# Patient Record
Sex: FEMALE | Race: WHITE | NOT HISPANIC OR LATINO
[De-identification: names, ages, dates, MRNs, and addresses within clinical notes are randomized per-mention and may not be internally consistent; named-entity substitution may affect disease eponyms.]

---

## 2017-12-22 PROBLEM — Z00.00 ENCOUNTER FOR PREVENTIVE HEALTH EXAMINATION: Status: ACTIVE | Noted: 2017-12-22

## 2017-12-27 ENCOUNTER — APPOINTMENT (OUTPATIENT)
Dept: BREAST CENTER | Facility: CLINIC | Age: 42
End: 2017-12-27
Payer: MEDICAID

## 2017-12-27 VITALS
HEART RATE: 85 BPM | WEIGHT: 118 LBS | OXYGEN SATURATION: 98 % | SYSTOLIC BLOOD PRESSURE: 160 MMHG | HEIGHT: 63 IN | DIASTOLIC BLOOD PRESSURE: 92 MMHG | BODY MASS INDEX: 20.91 KG/M2

## 2017-12-27 DIAGNOSIS — Z80.0 FAMILY HISTORY OF MALIGNANT NEOPLASM OF DIGESTIVE ORGANS: ICD-10-CM

## 2017-12-27 DIAGNOSIS — Z98.891 HISTORY OF UTERINE SCAR FROM PREVIOUS SURGERY: ICD-10-CM

## 2017-12-27 DIAGNOSIS — Z98.890 OTHER SPECIFIED POSTPROCEDURAL STATES: ICD-10-CM

## 2017-12-27 DIAGNOSIS — Z80.3 FAMILY HISTORY OF MALIGNANT NEOPLASM OF BREAST: ICD-10-CM

## 2017-12-27 DIAGNOSIS — Z80.52 FAMILY HISTORY OF MALIGNANT NEOPLASM OF BLADDER: ICD-10-CM

## 2017-12-27 DIAGNOSIS — Z80.1 FAMILY HISTORY OF MALIGNANT NEOPLASM OF TRACHEA, BRONCHUS AND LUNG: ICD-10-CM

## 2017-12-27 PROCEDURE — 99203 OFFICE O/P NEW LOW 30 MIN: CPT

## 2018-01-03 ENCOUNTER — OUTPATIENT (OUTPATIENT)
Dept: OUTPATIENT SERVICES | Facility: HOSPITAL | Age: 43
LOS: 1 days | Discharge: HOME | End: 2018-01-03

## 2018-01-03 DIAGNOSIS — Z02.9 ENCOUNTER FOR ADMINISTRATIVE EXAMINATIONS, UNSPECIFIED: ICD-10-CM

## 2018-01-12 ENCOUNTER — OUTPATIENT (OUTPATIENT)
Dept: OUTPATIENT SERVICES | Facility: HOSPITAL | Age: 43
LOS: 1 days | Discharge: HOME | End: 2018-01-12

## 2018-01-12 DIAGNOSIS — N60.22 FIBROADENOSIS OF LEFT BREAST: ICD-10-CM

## 2018-01-12 DIAGNOSIS — R92.0 MAMMOGRAPHIC MICROCALCIFICATION FOUND ON DIAGNOSTIC IMAGING OF BREAST: ICD-10-CM

## 2018-01-12 DIAGNOSIS — N60.32 FIBROSCLEROSIS OF LEFT BREAST: ICD-10-CM

## 2018-01-12 DIAGNOSIS — R92.1 MAMMOGRAPHIC CALCIFICATION FOUND ON DIAGNOSTIC IMAGING OF BREAST: ICD-10-CM

## 2018-01-12 DIAGNOSIS — N62 HYPERTROPHY OF BREAST: ICD-10-CM

## 2018-01-16 ENCOUNTER — CHART COPY (OUTPATIENT)
Age: 43
End: 2018-01-16

## 2018-01-16 DIAGNOSIS — R92.8 OTHER ABNORMAL AND INCONCLUSIVE FINDINGS ON DIAGNOSTIC IMAGING OF BREAST: ICD-10-CM

## 2018-01-19 ENCOUNTER — APPOINTMENT (OUTPATIENT)
Dept: BREAST CENTER | Facility: CLINIC | Age: 43
End: 2018-01-19

## 2018-01-26 ENCOUNTER — OUTPATIENT (OUTPATIENT)
Dept: OUTPATIENT SERVICES | Facility: HOSPITAL | Age: 43
LOS: 1 days | Discharge: HOME | End: 2018-01-26

## 2018-01-26 DIAGNOSIS — R10.10 UPPER ABDOMINAL PAIN, UNSPECIFIED: ICD-10-CM

## 2018-07-20 ENCOUNTER — APPOINTMENT (OUTPATIENT)
Dept: BREAST CENTER | Facility: CLINIC | Age: 43
End: 2018-07-20

## 2018-07-26 PROBLEM — Z80.0 FAMILY HISTORY OF COLON CANCER: Status: ACTIVE | Noted: 2017-12-27

## 2018-08-23 ENCOUNTER — FORM ENCOUNTER (OUTPATIENT)
Age: 43
End: 2018-08-23

## 2018-08-24 ENCOUNTER — OUTPATIENT (OUTPATIENT)
Dept: OUTPATIENT SERVICES | Facility: HOSPITAL | Age: 43
LOS: 1 days | Discharge: HOME | End: 2018-08-24

## 2018-08-24 DIAGNOSIS — R92.8 OTHER ABNORMAL AND INCONCLUSIVE FINDINGS ON DIAGNOSTIC IMAGING OF BREAST: ICD-10-CM

## 2018-09-12 ENCOUNTER — APPOINTMENT (OUTPATIENT)
Dept: BREAST CENTER | Facility: CLINIC | Age: 43
End: 2018-09-12

## 2019-08-21 ENCOUNTER — TRANSCRIPTION ENCOUNTER (OUTPATIENT)
Age: 44
End: 2019-08-21

## 2021-03-17 ENCOUNTER — OUTPATIENT (OUTPATIENT)
Dept: OUTPATIENT SERVICES | Facility: HOSPITAL | Age: 46
LOS: 1 days | Discharge: HOME | End: 2021-03-17
Payer: MEDICAID

## 2021-03-17 DIAGNOSIS — Z12.31 ENCOUNTER FOR SCREENING MAMMOGRAM FOR MALIGNANT NEOPLASM OF BREAST: ICD-10-CM

## 2021-03-17 DIAGNOSIS — R92.2 INCONCLUSIVE MAMMOGRAM: ICD-10-CM

## 2021-03-17 PROCEDURE — 77067 SCR MAMMO BI INCL CAD: CPT | Mod: 26

## 2021-03-17 PROCEDURE — 77063 BREAST TOMOSYNTHESIS BI: CPT | Mod: 26

## 2021-03-17 PROCEDURE — 76641 ULTRASOUND BREAST COMPLETE: CPT | Mod: 26,50

## 2021-05-11 ENCOUNTER — TRANSCRIPTION ENCOUNTER (OUTPATIENT)
Age: 46
End: 2021-05-11

## 2021-05-11 ENCOUNTER — NON-APPOINTMENT (OUTPATIENT)
Age: 46
End: 2021-05-11

## 2021-05-12 ENCOUNTER — NON-APPOINTMENT (OUTPATIENT)
Age: 46
End: 2021-05-12

## 2021-05-17 ENCOUNTER — NON-APPOINTMENT (OUTPATIENT)
Age: 46
End: 2021-05-17

## 2021-05-18 ENCOUNTER — NON-APPOINTMENT (OUTPATIENT)
Age: 46
End: 2021-05-18

## 2021-06-10 ENCOUNTER — TRANSCRIPTION ENCOUNTER (OUTPATIENT)
Age: 46
End: 2021-06-10

## 2021-07-06 ENCOUNTER — LABORATORY RESULT (OUTPATIENT)
Age: 46
End: 2021-07-06

## 2021-07-06 ENCOUNTER — APPOINTMENT (OUTPATIENT)
Dept: UROLOGY | Facility: CLINIC | Age: 46
End: 2021-07-06
Payer: COMMERCIAL

## 2021-07-06 ENCOUNTER — RESULT REVIEW (OUTPATIENT)
Age: 46
End: 2021-07-06

## 2021-07-06 VITALS — BODY MASS INDEX: 22.15 KG/M2 | WEIGHT: 125 LBS | HEIGHT: 63 IN

## 2021-07-06 PROCEDURE — 99204 OFFICE O/P NEW MOD 45 MIN: CPT

## 2021-07-06 NOTE — HISTORY OF PRESENT ILLNESS
[FreeTextEntry1] : OLIVIA GARICA is a 46 year old female who presents for consultation for recurrent UTIs for almost 2 decades. \par \par Pt reports she is experiencing dysuria, increased frequency, urgency. Denies gross hematuria. \par Pt reports the Sx have been persistent for 20 years since she had her first born. Pt reports she has been getting UTIs since her first pregnancy. \par Pt reports the UTIs would result as a result of post coital. Pt was taking post coital prophylaxis.\par UTI Sx resolution with Cipro but not with Macrobid. \par \par pt reports she saw her OBGYN in  and repots the pelvic exam was normal (as per pt). \par Pt reports she is taking 500 mg vitamin C per day. \par \par  PMH: recurrent post coital UTIs since the pt's first pregnancy. Pt reports 5-6  Symptomatic recurrent UTIs per year. \par Family History: No  malignancies\par Social History: Pt delivered with . [employer].\par \par UCx from 2021: Minor Marcescens >100,000 resist to bactrim\par \par

## 2021-07-06 NOTE — ASSESSMENT
[FreeTextEntry1] : OLIVIA GARCIA is a 46 year old female who presents for consultation for recurrent UTIs for 20 years, failed post coital ppx w macrobid.\par \par Plan: \par Start Methenamine with vitamin C. discussed side effects\par Increase fluid intake\par UA, UCx \par RBUS \par f/u in 2 months with the results. \par In the future we will consider daily low-dose antibiotic prophylaxis however at this time prefer to defer this option as patient is already forming resistant organism.  We can straight cath prior to necessary

## 2021-07-06 NOTE — ADDENDUM
[FreeTextEntry1] : Patient's note was transcribed with the assistance of a medical scribe under the supervision of Dr. Hess.\par I, Dr. Hess, have reviewed the patient's chart and agree that it aligns with my medical decisions.\par Hola Boothe, our scribe, also served as a chaperone for physical examination purposes.\par \par \par

## 2021-07-08 ENCOUNTER — NON-APPOINTMENT (OUTPATIENT)
Age: 46
End: 2021-07-08

## 2021-07-12 ENCOUNTER — NON-APPOINTMENT (OUTPATIENT)
Age: 46
End: 2021-07-12

## 2021-07-12 ENCOUNTER — OUTPATIENT (OUTPATIENT)
Dept: OUTPATIENT SERVICES | Facility: HOSPITAL | Age: 46
LOS: 1 days | Discharge: HOME | End: 2021-07-12
Payer: COMMERCIAL

## 2021-07-12 DIAGNOSIS — N39.0 URINARY TRACT INFECTION, SITE NOT SPECIFIED: ICD-10-CM

## 2021-07-12 LAB — BACTERIA UR CULT: ABNORMAL

## 2021-07-12 PROCEDURE — 76770 US EXAM ABDO BACK WALL COMP: CPT | Mod: 26

## 2021-07-15 ENCOUNTER — NON-APPOINTMENT (OUTPATIENT)
Age: 46
End: 2021-07-15

## 2021-09-13 ENCOUNTER — NON-APPOINTMENT (OUTPATIENT)
Age: 46
End: 2021-09-13

## 2021-09-13 LAB
APPEARANCE: CLEAR
BACTERIA UR CULT: NORMAL
BILIRUBIN URINE: NEGATIVE
BLOOD URINE: NEGATIVE
COLOR: NORMAL
GLUCOSE QUALITATIVE U: NEGATIVE
KETONES URINE: NEGATIVE
LEUKOCYTE ESTERASE URINE: NEGATIVE
NITRITE URINE: NEGATIVE
PH URINE: 6
PROTEIN URINE: NEGATIVE
SPECIFIC GRAVITY URINE: 1.01
UROBILINOGEN URINE: NORMAL

## 2021-09-14 ENCOUNTER — APPOINTMENT (OUTPATIENT)
Dept: UROLOGY | Facility: CLINIC | Age: 46
End: 2021-09-14
Payer: COMMERCIAL

## 2021-09-14 DIAGNOSIS — N39.0 URINARY TRACT INFECTION, SITE NOT SPECIFIED: ICD-10-CM

## 2021-09-14 DIAGNOSIS — R33.9 RETENTION OF URINE, UNSPECIFIED: ICD-10-CM

## 2021-09-14 DIAGNOSIS — M62.89 OTHER SPECIFIED DISORDERS OF MUSCLE: ICD-10-CM

## 2021-09-14 PROCEDURE — 99214 OFFICE O/P EST MOD 30 MIN: CPT | Mod: 95

## 2021-09-14 NOTE — ASSESSMENT
[FreeTextEntry1] : OLIVIA GARCIA is a 46 year old female who presents for consultation for recurrent UTIs for 20 years, failed post coital ppx with Macrobid. Pt is currently being treated with methenamine with satisfactory results and negative UCx.  \par \par Given incomplete bladder emptying with obstructive voiding symptoms, suspect pelvic floor dysfunction.  Recommend follow-up with urogynecology and examination. discussed  options such as meds, pelvic floor PT/biofeedback\par \par Plan: \par Continue Methenamine with vitamin C. \par Increase fluid intake\par Sitz bath\par timed voiding every 4 hours \par

## 2021-09-14 NOTE — HISTORY OF PRESENT ILLNESS
[Home] : at home, [unfilled] , at the time of the visit. [Medical Office: ([facility])___] : at the medical office located in  [FreeTextEntry1] : OLIVIA GARCIA is a 46 year old female who presents for consultation for recurrent UTIs for 20 years, failed post coital ppx w macrobid.\par \par Pt reports the methenamine treatment has improved her symptoms. Pt reports she has been feeling well with the treatment and is not experiencing UTI symptoms anymore. \par Pt denies dysuria, gross hematuria. Pt reports she is experiencing occasional hesitancy, intermittency and straining. Pt denies dyspareunia at this time. \par \par 2021: \par RBUS images visualized show:  Normal renal ultrasound. no stones or hydro\par Urinary bladder post void residual volume of 204 mL.\par \par 2021:\par UCx=ngtd \par UA:negative \par \par 2021:\par UCx= >100,000 CFU/ml Klebsiella variicola\par UA: negative // RBC=2 // WBC=2 // Bacteria=moderate \par \par Previously: \par Sx present >20 years\par pt reports she saw her OBGYN in  and repots the pelvic exam was normal (as per pt). \par Pt reports she is taking 500 mg vitamin C per day. \par \par  PMH: recurrent post coital UTIs since the pt's first pregnancy. Pt reports 5-6  Symptomatic recurrent UTIs per year. \par Family History: No  malignancies\par Social History: Pt delivered with . Real Estate.\par \par UCx from 2021: Minor Marcescens >100,000 resist to bactrim\par \par

## 2022-10-14 ENCOUNTER — RX RENEWAL (OUTPATIENT)
Age: 47
End: 2022-10-14

## 2022-12-30 DIAGNOSIS — Z12.39 ENCOUNTER FOR OTHER SCREENING FOR MALIGNANT NEOPLASM OF BREAST: ICD-10-CM

## 2023-01-05 ENCOUNTER — NON-APPOINTMENT (OUTPATIENT)
Age: 48
End: 2023-01-05

## 2023-01-05 ENCOUNTER — APPOINTMENT (OUTPATIENT)
Dept: OBGYN | Facility: CLINIC | Age: 48
End: 2023-01-05
Payer: COMMERCIAL

## 2023-01-05 VITALS
HEART RATE: 96 BPM | TEMPERATURE: 98 F | BODY MASS INDEX: 22.15 KG/M2 | WEIGHT: 125 LBS | SYSTOLIC BLOOD PRESSURE: 129 MMHG | HEIGHT: 63 IN | DIASTOLIC BLOOD PRESSURE: 80 MMHG

## 2023-01-05 DIAGNOSIS — N64.4 MASTODYNIA: ICD-10-CM

## 2023-01-05 LAB
BILIRUB UR QL STRIP: NEGATIVE
CLARITY UR: CLEAR
COLLECTION METHOD: NORMAL
GLUCOSE UR-MCNC: NEGATIVE
HCG UR QL: 0.2 EU/DL
HGB UR QL STRIP.AUTO: NEGATIVE
KETONES UR-MCNC: NEGATIVE
LEUKOCYTE ESTERASE UR QL STRIP: NEGATIVE
NITRITE UR QL STRIP: NEGATIVE
PH UR STRIP: 6.5
PROT UR STRIP-MCNC: NEGATIVE
SP GR UR STRIP: 1.01

## 2023-01-05 PROCEDURE — 99396 PREV VISIT EST AGE 40-64: CPT

## 2023-01-05 PROCEDURE — 81003 URINALYSIS AUTO W/O SCOPE: CPT | Mod: QW

## 2023-01-05 NOTE — REVIEW OF SYSTEMS
[Breast Pain] : breast pain [Negative] : Heme/Lymph [FreeTextEntry8] : followed by uro for incomplete emptying of bladder [de-identified] : bilateral breast pain, thinks it from the implants, wants removal

## 2023-01-05 NOTE — HISTORY OF PRESENT ILLNESS
[Patient reported PAP Smear was normal] : Patient reported PAP Smear was normal [N] : Patient does not use contraception [Y] : Positive pregnancy history [Diffused Pain] : diffused pain [Moderate] : moderate [Regular Cycle Intervals] : periods have been regular [Currently Active] : currently active [Men] : men [No] : No [FreeTextEntry1] : annual visit, no gyn complaints\par regular menses\par pt wants to remove her implants, has breast pain, worse prior to menses\par pt followed by uro for incomplete emptying of bladder [Mammogramdate] : 3/2021 [TextBox_19] : Breast Imaging [PapSmeardate] : 3/2021 [LMPDate] : 12/9/22 [MensesFreq] : 28 [MensesLength] : 4 [MensesAmount] : heavy day 1  only [PGHxTotal] : 2 [[facility] [TextBox_17] : bilateral

## 2023-01-05 NOTE — PHYSICAL EXAM
[Chaperone Present] : A chaperone was present in the examining room during all aspects of the physical examination [Appropriately responsive] : appropriately responsive [Alert] : alert [No Acute Distress] : no acute distress [Soft] : soft [Non-tender] : non-tender [Non-distended] : non-distended [Oriented x3] : oriented x3 [Examination Of The Breasts] : a normal appearance [] : implants [No Masses] : no breast masses were palpable [Labia Majora] : normal [Labia Minora] : normal [Normal] : normal [Uterine Adnexae] : normal

## 2023-01-08 LAB
C TRACH RRNA SPEC QL NAA+PROBE: NOT DETECTED
HPV HIGH+LOW RISK DNA PNL CVX: NOT DETECTED
N GONORRHOEA RRNA SPEC QL NAA+PROBE: NOT DETECTED
SOURCE AMPLIFICATION: NORMAL

## 2023-01-09 LAB — CYTOLOGY CVX/VAG DOC THIN PREP: NORMAL

## 2023-03-23 DIAGNOSIS — Z98.82 BREAST IMPLANT STATUS: ICD-10-CM

## 2023-04-05 ENCOUNTER — NON-APPOINTMENT (OUTPATIENT)
Age: 48
End: 2023-04-05

## 2023-04-12 ENCOUNTER — APPOINTMENT (OUTPATIENT)
Dept: PLASTIC SURGERY | Facility: CLINIC | Age: 48
End: 2023-04-12
Payer: COMMERCIAL

## 2023-04-12 VITALS — BODY MASS INDEX: 22.15 KG/M2 | WEIGHT: 125 LBS | HEIGHT: 63 IN

## 2023-04-12 PROCEDURE — 99203 OFFICE O/P NEW LOW 30 MIN: CPT

## 2023-04-12 NOTE — PHYSICAL EXAM
[NI] : Normal [de-identified] : well-appearing NAD [de-identified] : EOMI [de-identified] : no truncal asymmetry [de-identified] : Bilateral breasts symmetrical and Grade 1 ptosis.  No capsular contracture. Well-healed IMF scars.  BL breast implant animation with PMM contraction.\par Left breast: no palpable masses, nipple retraction or discharge.\par Right breast: no palpable masses, nipple retraction or discharge.\par Axilla: no clinically palpable lymph adenopathy bilateral\par Palpable LD muscle with moderate minimal soft tissue bulk, bilateral\par \par Breast measurements (standing; (cm)):\par Regnault ptosis grade: 1\par R SN-Nipple: 27\par R Nipple-IMF: 7\par R Base width: 14\par R Nipple - midsternum: 8.5\par R NAC diameter: 4\par \par IMF position asymmetrical, left 0.5 cm lower than right breast\par \par L SN-Nipple: 26\par L Nipple-IMF: 8\par L Base width: 14\par L Nipple - midsternum: 9\par L NAC diameter: 3.5\par

## 2023-04-12 NOTE — HISTORY OF PRESENT ILLNESS
[FreeTextEntry1] : 47 yo F with  with PMHx of chronic UTI and s/p submuscular saline breast augmentation ([id number] Dr. Cabral.  Pt would like to establish care with new Plastic surgeon. Pt thinks 320 ml fill BL.  She is here today with c/o Bl breast pain for several months occuring 1 week before menstruation and breast heaviness.  This has prompted her to consider implant removal.  No history of fatigue or joint pain, no breast masses or nipple discharge.  She had breast bx left breast bx - benign (2018)\par \par Has mammogram schedule for 23.\par Breast -full  A cup\par Current bra size 34 C, now DD\par No breast feeding history

## 2023-04-12 NOTE — ASSESSMENT
[FreeTextEntry1] : 49 yo F with  with PMHx of chronic UTI and s/p submuscular saline breast augmentation () with breast pain.  Inquiring about breast implant removal\par \par No evidence of implant capsular contracture or periprosthetic fluid or constitutional symptoms\par Excellent breast augmentation shape and symmetry\par \par No indication for implant removal\par \par -Reviewed options and outcomes should patient elective breast implant removal:\par 1. Breast saline implant rupture (purposeful needle aspiration), observation for post-rupture breast volume before proceeding removal with or without mastopexy (breast lift)\par 2. Bilateral breast implant removal and concurrent mastopexy (smaller final breast volume, possible contour irregularities)\par \par -recommend evaluation for Breast Surgery after annual mammogram for breast pain before considering implant removal.\par -all questions were answered\par -follow up after Breast surgery evaluation.

## 2023-05-10 ENCOUNTER — RESULT REVIEW (OUTPATIENT)
Age: 48
End: 2023-05-10

## 2023-05-10 ENCOUNTER — OUTPATIENT (OUTPATIENT)
Dept: OUTPATIENT SERVICES | Facility: HOSPITAL | Age: 48
LOS: 1 days | End: 2023-05-10
Payer: COMMERCIAL

## 2023-05-10 DIAGNOSIS — R92.2 INCONCLUSIVE MAMMOGRAM: ICD-10-CM

## 2023-05-10 DIAGNOSIS — Z12.31 ENCOUNTER FOR SCREENING MAMMOGRAM FOR MALIGNANT NEOPLASM OF BREAST: ICD-10-CM

## 2023-05-10 PROCEDURE — 77063 BREAST TOMOSYNTHESIS BI: CPT | Mod: 26

## 2023-05-10 PROCEDURE — 77067 SCR MAMMO BI INCL CAD: CPT

## 2023-05-10 PROCEDURE — 77067 SCR MAMMO BI INCL CAD: CPT | Mod: 26

## 2023-05-10 PROCEDURE — 76641 ULTRASOUND BREAST COMPLETE: CPT | Mod: 26,50

## 2023-05-10 PROCEDURE — 77063 BREAST TOMOSYNTHESIS BI: CPT

## 2023-05-10 PROCEDURE — 76641 ULTRASOUND BREAST COMPLETE: CPT | Mod: 50

## 2023-05-11 DIAGNOSIS — Z12.31 ENCOUNTER FOR SCREENING MAMMOGRAM FOR MALIGNANT NEOPLASM OF BREAST: ICD-10-CM

## 2023-05-11 DIAGNOSIS — R92.2 INCONCLUSIVE MAMMOGRAM: ICD-10-CM

## 2023-05-22 ENCOUNTER — APPOINTMENT (OUTPATIENT)
Dept: PLASTIC SURGERY | Facility: CLINIC | Age: 48
End: 2023-05-22

## 2023-07-20 ENCOUNTER — LABORATORY RESULT (OUTPATIENT)
Age: 48
End: 2023-07-20

## 2023-07-20 ENCOUNTER — APPOINTMENT (OUTPATIENT)
Dept: OBGYN | Facility: CLINIC | Age: 48
End: 2023-07-20
Payer: COMMERCIAL

## 2023-07-20 ENCOUNTER — NON-APPOINTMENT (OUTPATIENT)
Age: 48
End: 2023-07-20

## 2023-07-20 VITALS
DIASTOLIC BLOOD PRESSURE: 78 MMHG | WEIGHT: 130 LBS | HEART RATE: 103 BPM | SYSTOLIC BLOOD PRESSURE: 125 MMHG | BODY MASS INDEX: 23.04 KG/M2 | HEIGHT: 63 IN

## 2023-07-20 DIAGNOSIS — N89.8 OTHER SPECIFIED NONINFLAMMATORY DISORDERS OF VAGINA: ICD-10-CM

## 2023-07-20 LAB
BILIRUB UR QL STRIP: NEGATIVE
CLARITY UR: CLEAR
COLLECTION METHOD: NORMAL
GLUCOSE UR-MCNC: NEGATIVE
HCG UR QL: 0.2 EU/DL
HGB UR QL STRIP.AUTO: NORMAL
KETONES UR-MCNC: NEGATIVE
LEUKOCYTE ESTERASE UR QL STRIP: NORMAL
NITRITE UR QL STRIP: NEGATIVE
PH UR STRIP: 7
PROT UR STRIP-MCNC: NEGATIVE
SP GR UR STRIP: 1.02

## 2023-07-20 PROCEDURE — 99213 OFFICE O/P EST LOW 20 MIN: CPT

## 2023-07-20 PROCEDURE — 81003 URINALYSIS AUTO W/O SCOPE: CPT | Mod: QW

## 2023-07-20 RX ORDER — SULFAMETHOXAZOLE AND TRIMETHOPRIM 800; 160 MG/1; MG/1
800-160 TABLET ORAL
Qty: 20 | Refills: 0 | Status: COMPLETED | COMMUNITY
Start: 2021-05-11 | End: 2023-07-20

## 2023-07-20 RX ORDER — CIPROFLOXACIN HYDROCHLORIDE 500 MG/1
500 TABLET, FILM COATED ORAL
Qty: 10 | Refills: 0 | Status: COMPLETED | COMMUNITY
Start: 2021-05-17 | End: 2023-07-20

## 2023-07-20 RX ORDER — ACETAMINOPHEN 160 MG/5ML
500 SUSPENSION ORAL
Refills: 0 | Status: ACTIVE | COMMUNITY

## 2023-07-20 RX ORDER — NITROFURANTOIN (MONOHYDRATE/MACROCRYSTALS) 25; 75 MG/1; MG/1
100 CAPSULE ORAL
Qty: 14 | Refills: 0 | Status: COMPLETED | COMMUNITY
Start: 2021-03-29 | End: 2023-07-20

## 2023-07-20 RX ORDER — FLUCONAZOLE 150 MG/1
150 TABLET ORAL
Qty: 1 | Refills: 0 | Status: COMPLETED | COMMUNITY
Start: 2021-06-10 | End: 2023-07-20

## 2023-07-20 RX ORDER — AMOXICILLIN 500 MG/1
500 CAPSULE ORAL
Qty: 30 | Refills: 0 | Status: COMPLETED | COMMUNITY
Start: 2021-06-10 | End: 2023-07-20

## 2023-07-20 RX ORDER — ZINC 25 MG
25 TABLET ORAL
Refills: 0 | Status: ACTIVE | COMMUNITY

## 2023-07-20 RX ORDER — OMEGA-3/DHA/EPA/FISH OIL 300-1000MG
1000 CAPSULE ORAL
Refills: 0 | Status: ACTIVE | COMMUNITY

## 2023-07-20 RX ORDER — MENTHOL/CAMPHOR 0.5 %-0.5%
1000 LOTION (ML) TOPICAL
Refills: 0 | Status: ACTIVE | COMMUNITY

## 2023-07-20 RX ORDER — METHENAMINE HIPPURATE 1 G/1
1 TABLET ORAL TWICE DAILY
Qty: 60 | Refills: 11 | Status: COMPLETED | COMMUNITY
Start: 2021-07-06 | End: 2023-07-20

## 2023-07-20 NOTE — PLAN
[FreeTextEntry1] : start ABS & Pyridium\par pt stopped methenamine on her own, I informed her the OTC medication she bought was methenamine as well as she shouldn't take it.

## 2023-07-20 NOTE — PHYSICAL EXAM
[Chaperone Present] : A chaperone was present in the examining room during all aspects of the physical examination [Appropriately responsive] : appropriately responsive [Alert] : alert [Non-tender] : non-tender [Soft] : soft [Non-distended] : non-distended [Oriented x3] : oriented x3 [Labia Majora] : normal [Labia Minora] : normal [Discharge] : a  ~M vaginal discharge was present [Moderate] : moderate [Foul Smelling] : not foul smelling [White] : white [Thick] : thick [Normal] : normal [Uterine Adnexae] : normal

## 2023-07-20 NOTE — HISTORY OF PRESENT ILLNESS
[Patient reported PAP Smear was normal] : Patient reported PAP Smear was normal [N] : Patient does not use contraception [Y] : Positive pregnancy history [Mammogramdate] : 05/2023 [TextBox_19] : Breast Imaging [PapSmeardate] : 1.2023 [LMPDate] : 7/2/23 [MensesFreq] : 28 [MensesLength] : 4 [MensesAmount] : heavy day 1  only [PGHxTotal] : 2 [[facility] : [facility] [Regular Cycle Intervals] : periods have been regular [FreeTextEntry1] : 7/2/23 [Currently Active] : currently active [Men] : men [No] : No

## 2023-07-20 NOTE — DISCUSSION/SUMMARY
[FreeTextEntry1] : Reviewed instructions for use for medications prescribed today. Patient understood\par

## 2023-07-24 LAB
APPEARANCE: CLEAR
BACTERIA UR CULT: NORMAL
BILIRUBIN URINE: NEGATIVE
BLOOD URINE: ABNORMAL
COLOR: YELLOW
GLUCOSE QUALITATIVE U: NEGATIVE MG/DL
KETONES URINE: NEGATIVE MG/DL
LEUKOCYTE ESTERASE URINE: ABNORMAL
NITRITE URINE: NEGATIVE
PH URINE: 6.5
PROTEIN URINE: NEGATIVE MG/DL
SPECIFIC GRAVITY URINE: 1.01
UROBILINOGEN URINE: 0.2 MG/DL

## 2023-07-31 ENCOUNTER — APPOINTMENT (OUTPATIENT)
Dept: UROGYNECOLOGY | Facility: CLINIC | Age: 48
End: 2023-07-31
Payer: COMMERCIAL

## 2023-07-31 VITALS
WEIGHT: 130 LBS | DIASTOLIC BLOOD PRESSURE: 78 MMHG | BODY MASS INDEX: 23.04 KG/M2 | HEIGHT: 63 IN | HEART RATE: 79 BPM | SYSTOLIC BLOOD PRESSURE: 126 MMHG

## 2023-07-31 DIAGNOSIS — Z01.419 ENCOUNTER FOR GYNECOLOGICAL EXAMINATION (GENERAL) (ROUTINE) W/OUT ABNORMAL FINDINGS: ICD-10-CM

## 2023-07-31 PROCEDURE — 51701 INSERT BLADDER CATHETER: CPT

## 2023-07-31 PROCEDURE — 99205 OFFICE O/P NEW HI 60 MIN: CPT | Mod: 25

## 2023-08-01 LAB
APPEARANCE: CLEAR
BILIRUBIN URINE: NEGATIVE
BLOOD URINE: NEGATIVE
COLOR: YELLOW
GLUCOSE QUALITATIVE U: NEGATIVE MG/DL
KETONES URINE: NEGATIVE MG/DL
LEUKOCYTE ESTERASE URINE: NEGATIVE
NITRITE URINE: NEGATIVE
PH URINE: 6
PROTEIN URINE: NEGATIVE MG/DL
SPECIFIC GRAVITY URINE: 1.01
UROBILINOGEN URINE: 0.2 MG/DL

## 2023-08-01 NOTE — HISTORY OF PRESENT ILLNESS
[FreeTextEntry1] : 48 year para 2 (c/s x2) presents with complaints of recurring UTIs. They usually occur 1-2 days after intercourse. Took nitrofurantoin postcoitally. Seen by Urology previously. Took methenamine but stopped several months ago. Typical sxs: burning with urination, urinary hesitancy, urinary urgency Currently no sxs.  Pelvic organ prolapse: no bulge, no pressure/heaviness  Stress urinary incontinence: no x/week no prior incontinence procedures  Overactive bladder syndrome: daily frequency 8-10x/day, 3-4x/night, no urgency, no x/week UUI episodes, no pads/day     Bladder irritants include coffee,    Prior OAB meds no  Voiding dysfunction: no Incomplete bladder emptying, no hesitancy  Lower urinary tract/vaginal symptoms: 2 UTIs per year, no hematuria, + dysuria, + bladder pain  7 BM/week   no constipation   Fecal incontinence no  Sexually active + (4x/week)   Dyspareunia no   Pelvic pain no   Vaginal dryness no

## 2023-08-01 NOTE — REASON FOR VISIT
[TextEntry] : Reason for visit: New Patient Voids per day:   8-10 Voids per night:   3-4 Urge incontinence : No Stress incontinence: No Constipation: No Fecal incontinence: No Vaginal bulge: No

## 2023-08-01 NOTE — PHYSICAL EXAM
[Chaperone Present] : A chaperone was present in the examining room during all aspects of the physical examination [FreeTextEntry1] : Void: 100 cc  PVR: 30 cc  Urethra was prepped in sterile fashion and then a sterile 14F catheter was used by me to drain the bladder for her symptoms of nocturia. Patient tolerated the procedure well  Well healed incision: abdominoplasty, Pfannenstiel  neg empty cough stress test  no atrophy  no urethral caruncle  no vestibular tenderness  no prolapse  + urethral hypermobility  no pelvic floor dysfunction  no urethral tenderness  no bladder tenderness  normal appearing cervix  normal sized uterus  adnexa nonpalpable  intact sacral nerves

## 2023-08-01 NOTE — ASSESSMENT
[FreeTextEntry1] : UTI Sxs - Discussed definition of Devendra. Patient currently does not meet diagnosis. Advised patient to call with any future sxs of UTI. If meets diagnosis, will recommend workup and appropriate therapy. In the meantime recommended D-mannose and cranberry extract containing 36 PAC.  Nocturia - Not as bothersome to the patient. Will proceed with observation.

## 2023-08-01 NOTE — COUNSELING
[FreeTextEntry1] : Please call with symptoms of a UTI.  You can take D-mannose and/or cranberry extract that contains the following ingredient: 36 PAC.

## 2023-08-03 LAB — URINE CULTURE <10: NORMAL

## 2023-08-17 ENCOUNTER — LABORATORY RESULT (OUTPATIENT)
Age: 48
End: 2023-08-17

## 2023-08-17 RX ORDER — NITROFURANTOIN (MONOHYDRATE/MACROCRYSTALS) 25; 75 MG/1; MG/1
100 CAPSULE ORAL
Qty: 14 | Refills: 0 | Status: DISCONTINUED | COMMUNITY
Start: 2023-08-17 | End: 2023-08-17

## 2023-08-22 LAB — BACTERIA UR CULT: ABNORMAL

## 2023-10-15 ENCOUNTER — LABORATORY RESULT (OUTPATIENT)
Age: 48
End: 2023-10-15

## 2023-10-16 RX ORDER — SULFAMETHOXAZOLE AND TRIMETHOPRIM 800; 160 MG/1; MG/1
800-160 TABLET ORAL TWICE DAILY
Qty: 14 | Refills: 0 | Status: COMPLETED | COMMUNITY
Start: 2023-10-16 | End: 2023-10-23

## 2023-10-18 LAB — BACTERIA UR CULT: NORMAL

## 2023-10-29 ENCOUNTER — LABORATORY RESULT (OUTPATIENT)
Age: 48
End: 2023-10-29

## 2023-10-30 ENCOUNTER — RESULT CHARGE (OUTPATIENT)
Age: 48
End: 2023-10-30

## 2023-10-30 ENCOUNTER — APPOINTMENT (OUTPATIENT)
Dept: UROGYNECOLOGY | Facility: CLINIC | Age: 48
End: 2023-10-30
Payer: COMMERCIAL

## 2023-10-30 VITALS
WEIGHT: 129 LBS | DIASTOLIC BLOOD PRESSURE: 73 MMHG | SYSTOLIC BLOOD PRESSURE: 109 MMHG | HEIGHT: 63 IN | BODY MASS INDEX: 22.86 KG/M2 | HEART RATE: 78 BPM

## 2023-10-30 DIAGNOSIS — R30.0 DYSURIA: ICD-10-CM

## 2023-10-30 DIAGNOSIS — R35.1 NOCTURIA: ICD-10-CM

## 2023-10-30 DIAGNOSIS — R39.9 UNSPECIFIED SYMPTOMS AND SIGNS INVOLVING THE GENITOURINARY SYSTEM: ICD-10-CM

## 2023-10-30 LAB
BILIRUB UR QL STRIP: NEGATIVE
CLARITY UR: CLEAR
COLLECTION METHOD: NORMAL
GLUCOSE UR-MCNC: NEGATIVE
HCG UR QL: 0.2 EU/DL
HCG UR QL: NEGATIVE
HGB UR QL STRIP.AUTO: NORMAL
KETONES UR-MCNC: NEGATIVE
LEUKOCYTE ESTERASE UR QL STRIP: NEGATIVE
NITRITE UR QL STRIP: NEGATIVE
PH UR STRIP: 5.5
PROT UR STRIP-MCNC: NEGATIVE
QUALITY CONTROL: YES
SP GR UR STRIP: 1.02

## 2023-10-30 PROCEDURE — 81025 URINE PREGNANCY TEST: CPT

## 2023-10-30 PROCEDURE — 52000 CYSTOURETHROSCOPY: CPT

## 2023-10-30 PROCEDURE — 81003 URINALYSIS AUTO W/O SCOPE: CPT | Mod: QW

## 2023-10-30 RX ORDER — AZITHROMYCIN 250 MG/1
250 TABLET, FILM COATED ORAL
Qty: 6 | Refills: 0 | Status: COMPLETED | COMMUNITY
Start: 2023-06-26 | End: 2023-10-30

## 2023-10-30 RX ORDER — CIPROFLOXACIN HYDROCHLORIDE 500 MG/1
500 TABLET, FILM COATED ORAL
Qty: 6 | Refills: 0 | Status: COMPLETED | COMMUNITY
Start: 2023-08-17 | End: 2023-10-30

## 2023-10-30 RX ORDER — PHENAZOPYRIDINE HYDROCHLORIDE 200 MG/1
200 TABLET ORAL 3 TIMES DAILY
Qty: 6 | Refills: 0 | Status: COMPLETED | COMMUNITY
Start: 2023-07-20 | End: 2023-10-30

## 2023-10-30 RX ORDER — SULFAMETHOXAZOLE AND TRIMETHOPRIM 800; 160 MG/1; MG/1
800-160 TABLET ORAL TWICE DAILY
Qty: 14 | Refills: 0 | Status: COMPLETED | COMMUNITY
Start: 2023-07-20 | End: 2023-10-30

## 2023-11-01 LAB
APPEARANCE: CLEAR
BILIRUBIN URINE: NEGATIVE
BLOOD URINE: ABNORMAL
COLOR: YELLOW
GLUCOSE QUALITATIVE U: NEGATIVE MG/DL
KETONES URINE: NEGATIVE MG/DL
LEUKOCYTE ESTERASE URINE: NEGATIVE
NITRITE URINE: NEGATIVE
PH URINE: 5.5
PROTEIN URINE: NEGATIVE MG/DL
SPECIFIC GRAVITY URINE: 1.02
UROBILINOGEN URINE: 0.2 MG/DL

## 2023-11-02 LAB — URINE CULTURE <10: NORMAL

## 2024-03-27 ENCOUNTER — LABORATORY RESULT (OUTPATIENT)
Age: 49
End: 2024-03-27

## 2024-03-28 RX ORDER — CIPROFLOXACIN HYDROCHLORIDE 500 MG/1
500 TABLET, FILM COATED ORAL
Qty: 14 | Refills: 0 | Status: ACTIVE | COMMUNITY
Start: 2021-07-12 | End: 1900-01-01

## 2024-03-28 RX ORDER — NITROFURANTOIN (MONOHYDRATE/MACROCRYSTALS) 25; 75 MG/1; MG/1
100 CAPSULE ORAL
Qty: 14 | Refills: 0 | Status: ACTIVE | COMMUNITY
Start: 2024-03-28 | End: 1900-01-01

## 2024-03-28 RX ORDER — PHENAZOPYRIDINE HYDROCHLORIDE 200 MG/1
200 TABLET ORAL EVERY 8 HOURS
Qty: 20 | Refills: 0 | Status: ACTIVE | COMMUNITY
Start: 2023-10-16 | End: 1900-01-01

## 2024-03-31 LAB
APPEARANCE: CLEAR
BILIRUBIN URINE: NEGATIVE
BLOOD URINE: NEGATIVE
COLOR: YELLOW
GLUCOSE QUALITATIVE U: NEGATIVE MG/DL
KETONES URINE: NEGATIVE MG/DL
LEUKOCYTE ESTERASE URINE: ABNORMAL
NITRITE URINE: NEGATIVE
PH URINE: 7
PROTEIN URINE: NEGATIVE MG/DL
SPECIFIC GRAVITY URINE: 1.01
UROBILINOGEN URINE: 0.2 MG/DL

## 2024-04-01 LAB — BACTERIA UR CULT: ABNORMAL

## 2024-05-28 ENCOUNTER — LABORATORY RESULT (OUTPATIENT)
Age: 49
End: 2024-05-28

## 2024-05-28 RX ORDER — SULFAMETHOXAZOLE AND TRIMETHOPRIM 800; 160 MG/1; MG/1
800-160 TABLET ORAL TWICE DAILY
Qty: 14 | Refills: 0 | Status: ACTIVE | COMMUNITY
Start: 2024-05-28 | End: 1900-01-01

## 2024-05-31 LAB
APPEARANCE: CLEAR
BILIRUBIN URINE: NEGATIVE
BLOOD URINE: ABNORMAL
COLOR: YELLOW
GLUCOSE QUALITATIVE U: NEGATIVE MG/DL
KETONES URINE: NEGATIVE MG/DL
LEUKOCYTE ESTERASE URINE: ABNORMAL
NITRITE URINE: POSITIVE
PH URINE: 7.5
PROTEIN URINE: NORMAL MG/DL
SPECIFIC GRAVITY URINE: 1.02
UROBILINOGEN URINE: 0.2 MG/DL

## 2024-06-03 LAB — BACTERIA UR CULT: ABNORMAL

## 2024-06-03 RX ORDER — PHENAZOPYRIDINE 200 MG/1
200 TABLET, FILM COATED ORAL 3 TIMES DAILY
Qty: 30 | Refills: 0 | Status: ACTIVE | COMMUNITY
Start: 2024-06-03 | End: 1900-01-01

## 2024-06-07 LAB
APPEARANCE: CLEAR
BACTERIA UR CULT: NORMAL
BILIRUBIN URINE: NEGATIVE
BLOOD URINE: NEGATIVE
COLOR: NORMAL
GLUCOSE QUALITATIVE U: NEGATIVE MG/DL
KETONES URINE: NEGATIVE MG/DL
LEUKOCYTE ESTERASE URINE: NEGATIVE
NITRITE URINE: NEGATIVE
PH URINE: 5.5
PROTEIN URINE: NEGATIVE MG/DL
SPECIFIC GRAVITY URINE: 1.02
UROBILINOGEN URINE: 0.2 MG/DL

## 2024-07-31 ENCOUNTER — LABORATORY RESULT (OUTPATIENT)
Age: 49
End: 2024-07-31

## 2024-07-31 RX ORDER — TRIMETHOPRIM 100 MG/1
100 TABLET ORAL
Qty: 30 | Refills: 3 | Status: ACTIVE | COMMUNITY
Start: 2024-07-31 | End: 1900-01-01

## 2024-07-31 RX ORDER — SULFAMETHOXAZOLE AND TRIMETHOPRIM 800; 160 MG/1; MG/1
800-160 TABLET ORAL
Qty: 6 | Refills: 0 | Status: ACTIVE | COMMUNITY
Start: 2024-07-31 | End: 1900-01-01

## 2024-08-02 LAB
APPEARANCE: CLEAR
BILIRUBIN URINE: NEGATIVE
BLOOD URINE: NEGATIVE
COLOR: YELLOW
GLUCOSE QUALITATIVE U: NEGATIVE MG/DL
KETONES URINE: NEGATIVE MG/DL
LEUKOCYTE ESTERASE URINE: ABNORMAL
NITRITE URINE: NEGATIVE
PH URINE: 6
PROTEIN URINE: NEGATIVE MG/DL
SPECIFIC GRAVITY URINE: 1.01
UROBILINOGEN URINE: 0.2 MG/DL

## 2024-08-05 LAB — BACTERIA UR CULT: ABNORMAL

## 2024-09-16 ENCOUNTER — APPOINTMENT (OUTPATIENT)
Dept: OBGYN | Facility: CLINIC | Age: 49
End: 2024-09-16
Payer: COMMERCIAL

## 2024-09-16 ENCOUNTER — LABORATORY RESULT (OUTPATIENT)
Age: 49
End: 2024-09-16

## 2024-09-16 VITALS
SYSTOLIC BLOOD PRESSURE: 120 MMHG | BODY MASS INDEX: 23.04 KG/M2 | HEIGHT: 63 IN | HEART RATE: 89 BPM | DIASTOLIC BLOOD PRESSURE: 80 MMHG | WEIGHT: 130 LBS

## 2024-09-16 DIAGNOSIS — N92.0 EXCESSIVE AND FREQUENT MENSTRUATION WITH REGULAR CYCLE: ICD-10-CM

## 2024-09-16 DIAGNOSIS — Z01.419 ENCOUNTER FOR GYNECOLOGICAL EXAMINATION (GENERAL) (ROUTINE) W/OUT ABNORMAL FINDINGS: ICD-10-CM

## 2024-09-16 LAB
BILIRUB UR QL STRIP: NEGATIVE
CLARITY UR: CLEAR
COLLECTION METHOD: NORMAL
GLUCOSE UR-MCNC: NEGATIVE
HCG UR QL: 0.2 EU/DL
HGB UR QL STRIP.AUTO: NEGATIVE
KETONES UR-MCNC: NEGATIVE
LEUKOCYTE ESTERASE UR QL STRIP: NEGATIVE
NITRITE UR QL STRIP: NEGATIVE
PH UR STRIP: 7
PROT UR STRIP-MCNC: NEGATIVE
SP GR UR STRIP: 1.01

## 2024-09-16 PROCEDURE — 76830 TRANSVAGINAL US NON-OB: CPT

## 2024-09-16 PROCEDURE — 99396 PREV VISIT EST AGE 40-64: CPT | Mod: 25

## 2024-09-16 PROCEDURE — 81003 URINALYSIS AUTO W/O SCOPE: CPT | Mod: QW

## 2024-09-16 PROCEDURE — 99459 PELVIC EXAMINATION: CPT

## 2024-09-16 RX ORDER — PNV NO.95/FERROUS FUM/FOLIC AC 28MG-0.8MG
TABLET ORAL
Refills: 0 | Status: ACTIVE | COMMUNITY

## 2024-09-16 NOTE — PROCEDURE
Physical Therapy Missed Treatment   Facility/Department: [facility] [id number]/R243-01    NAME: Mulu Pandya    : 1954 (69 y.o.)  MRN: [id number]    Account: [id number]  Gender: female      Pt is cancelled per nursing this date due to newly dx bilat PE. Spoke with pts nurse to confirm cancellation      Filomena Short, PT, 24 at 7:09 AM    [Cervical Pap Smear] : cervical Pap smear [Liquid Base] : liquid base [GC & Chlamydia via Pap] : GC & Chlamydia via Pap [Tolerated Well] : the patient tolerated the procedure well [No Complications] : there were no complications [Abnormal Uterine Bleeding] : abnormal uterine bleeding [Transvaginal Ultrasound] : transvaginal ultrasound [L: ___ cm] : L: [unfilled] cm [W: ___cm] : W: [unfilled] cm [H: ___ cm] : H: [unfilled] cm [FreeTextEntry5] : 135.14cc vol [FreeTextEntry7] : 1.56 [FreeTextEntry8] : 2.79

## 2024-09-16 NOTE — PHYSICAL EXAM
[Chaperone Present] : A chaperone was present in the examining room during all aspects of the physical examination [[id number]] : A chaperone was present during the pelvic exam. [Appropriately responsive] : appropriately responsive [Alert] : alert [No Acute Distress] : no acute distress [Soft] : soft [Non-tender] : non-tender [Non-distended] : non-distended [Oriented x3] : oriented x3 [Examination Of The Breasts] : a normal appearance [No Discharge] : no discharge [No Masses] : no breast masses were palpable [Labia Majora] : normal [Labia Minora] : normal [Normal] : normal [Uterine Adnexae] : normal [Nabothian Cyst ___ cm] : [unfilled] ~Ucm nabothian cyst

## 2024-09-16 NOTE — HISTORY OF PRESENT ILLNESS
[N] : Patient does not use contraception [Y] : Positive pregnancy history [Normal Amount/Duration] :  normal amount and duration [Regular Cycle Intervals] : periods have been regular [No] : Patient does not have concerns regarding sex [Currently Active] : currently active [LMPDate] : 09/09/24 [MensesFreq] : 28 [MensesLength] : 9 [MensesAmount] : heavy [PGHxTotal] : 2 [[facility] : 2 [FreeTextEntry1] : 09/09/24

## 2024-12-16 ENCOUNTER — NON-APPOINTMENT (OUTPATIENT)
Age: 49
End: 2024-12-16

## 2024-12-19 ENCOUNTER — NON-APPOINTMENT (OUTPATIENT)
Age: 49
End: 2024-12-19

## 2024-12-19 ENCOUNTER — APPOINTMENT (OUTPATIENT)
Dept: OBGYN | Facility: CLINIC | Age: 49
End: 2024-12-19
Payer: COMMERCIAL

## 2024-12-19 VITALS
SYSTOLIC BLOOD PRESSURE: 130 MMHG | HEART RATE: 89 BPM | WEIGHT: 130 LBS | DIASTOLIC BLOOD PRESSURE: 84 MMHG | BODY MASS INDEX: 23.04 KG/M2 | HEIGHT: 63 IN

## 2024-12-19 DIAGNOSIS — D50.9 IRON DEFICIENCY ANEMIA, UNSPECIFIED: ICD-10-CM

## 2024-12-19 DIAGNOSIS — N92.0 EXCESSIVE AND FREQUENT MENSTRUATION WITH REGULAR CYCLE: ICD-10-CM

## 2024-12-19 PROCEDURE — 76830 TRANSVAGINAL US NON-OB: CPT

## 2024-12-19 PROCEDURE — 99213 OFFICE O/P EST LOW 20 MIN: CPT | Mod: 25

## 2024-12-19 PROCEDURE — 99459 PELVIC EXAMINATION: CPT

## 2025-01-07 ENCOUNTER — APPOINTMENT (OUTPATIENT)
Dept: OBGYN | Facility: CLINIC | Age: 50
End: 2025-01-07
Payer: COMMERCIAL

## 2025-01-07 VITALS
TEMPERATURE: 98.2 F | WEIGHT: 129 LBS | HEART RATE: 90 BPM | SYSTOLIC BLOOD PRESSURE: 135 MMHG | DIASTOLIC BLOOD PRESSURE: 81 MMHG | HEIGHT: 63 IN | BODY MASS INDEX: 22.86 KG/M2

## 2025-01-07 DIAGNOSIS — B00.1 HERPESVIRAL VESICULAR DERMATITIS: ICD-10-CM

## 2025-01-07 DIAGNOSIS — N92.0 EXCESSIVE AND FREQUENT MENSTRUATION WITH REGULAR CYCLE: ICD-10-CM

## 2025-01-07 PROCEDURE — 81025 URINE PREGNANCY TEST: CPT

## 2025-01-07 PROCEDURE — 58558Z: CUSTOM

## 2025-01-07 RX ORDER — VALACYCLOVIR 1 G/1
1 TABLET, FILM COATED ORAL
Qty: 12 | Refills: 1 | Status: ACTIVE | COMMUNITY
Start: 2025-01-07 | End: 1900-01-01

## 2025-01-08 ENCOUNTER — NON-APPOINTMENT (OUTPATIENT)
Age: 50
End: 2025-01-08

## 2025-01-12 LAB — CORE LAB BIOPSY: NORMAL

## 2025-01-21 ENCOUNTER — NON-APPOINTMENT (OUTPATIENT)
Age: 50
End: 2025-01-21

## 2025-03-18 ENCOUNTER — APPOINTMENT (OUTPATIENT)
Dept: GASTROENTEROLOGY | Facility: CLINIC | Age: 50
End: 2025-03-18
Payer: COMMERCIAL

## 2025-03-18 VITALS — HEIGHT: 63 IN | WEIGHT: 131.8 LBS | BODY MASS INDEX: 23.35 KG/M2

## 2025-03-18 DIAGNOSIS — K62.5 HEMORRHAGE OF ANUS AND RECTUM: ICD-10-CM

## 2025-03-18 DIAGNOSIS — K92.1 MELENA: ICD-10-CM

## 2025-03-18 DIAGNOSIS — R14.3 FLATULENCE: ICD-10-CM

## 2025-03-18 DIAGNOSIS — R14.0 ABDOMINAL DISTENSION (GASEOUS): ICD-10-CM

## 2025-03-18 DIAGNOSIS — K59.00 CONSTIPATION, UNSPECIFIED: ICD-10-CM

## 2025-03-18 DIAGNOSIS — R12 HEARTBURN: ICD-10-CM

## 2025-03-18 DIAGNOSIS — R19.5 OTHER FECAL ABNORMALITIES: ICD-10-CM

## 2025-03-18 DIAGNOSIS — R10.30 LOWER ABDOMINAL PAIN, UNSPECIFIED: ICD-10-CM

## 2025-03-18 DIAGNOSIS — Z78.9 OTHER SPECIFIED HEALTH STATUS: ICD-10-CM

## 2025-03-18 DIAGNOSIS — K62.89 OTHER SPECIFIED DISEASES OF ANUS AND RECTUM: ICD-10-CM

## 2025-03-18 PROCEDURE — 99204 OFFICE O/P NEW MOD 45 MIN: CPT

## 2025-03-18 RX ORDER — POLYETHYLENE GLYCOL 3350, SODIUM SULFATE, POTASSIUM CHLORIDE, MAGNESIUM SULFATE, AND SODIUM CHLORIDE FOR ORAL SOLUTION 178.7-7.3G
178.7 KIT ORAL AS DIRECTED
Qty: 1 | Refills: 0 | Status: ACTIVE | COMMUNITY
Start: 2025-03-18 | End: 1900-01-01

## 2025-03-20 RX ORDER — POLYETHYLENE GLYCOL 3350 17 G/17G
17 POWDER, FOR SOLUTION ORAL DAILY
Qty: 30 | Refills: 2 | Status: ACTIVE | COMMUNITY
Start: 2025-03-18 | End: 1900-01-01

## 2025-03-20 RX ORDER — PANTOPRAZOLE 40 MG/1
40 TABLET, DELAYED RELEASE ORAL
Qty: 30 | Refills: 3 | Status: ACTIVE | COMMUNITY
Start: 2025-03-18 | End: 1900-01-01

## 2025-03-20 RX ORDER — HYDROCORTISONE ACETATE 25 MG/1
25 SUPPOSITORY RECTAL
Qty: 10 | Refills: 0 | Status: ACTIVE | COMMUNITY
Start: 2025-03-18 | End: 1900-01-01

## 2025-04-16 ENCOUNTER — RESULT REVIEW (OUTPATIENT)
Age: 50
End: 2025-04-16

## 2025-04-16 ENCOUNTER — TRANSCRIPTION ENCOUNTER (OUTPATIENT)
Age: 50
End: 2025-04-16

## 2025-04-16 ENCOUNTER — OUTPATIENT (OUTPATIENT)
Dept: OUTPATIENT SERVICES | Facility: HOSPITAL | Age: 50
LOS: 1 days | Discharge: HOME | End: 2025-04-16
Payer: COMMERCIAL

## 2025-04-16 VITALS
DIASTOLIC BLOOD PRESSURE: 72 MMHG | OXYGEN SATURATION: 100 % | SYSTOLIC BLOOD PRESSURE: 120 MMHG | TEMPERATURE: 97 F | RESPIRATION RATE: 26 BRPM | HEART RATE: 74 BPM

## 2025-04-16 VITALS
WEIGHT: 130.07 LBS | OXYGEN SATURATION: 98 % | TEMPERATURE: 98 F | HEIGHT: 63 IN | SYSTOLIC BLOOD PRESSURE: 136 MMHG | HEART RATE: 99 BPM | DIASTOLIC BLOOD PRESSURE: 83 MMHG | RESPIRATION RATE: 17 BRPM

## 2025-04-16 DIAGNOSIS — K59.00 CONSTIPATION, UNSPECIFIED: ICD-10-CM

## 2025-04-16 DIAGNOSIS — D50.9 IRON DEFICIENCY ANEMIA, UNSPECIFIED: ICD-10-CM

## 2025-04-16 DIAGNOSIS — Z98.891 HISTORY OF UTERINE SCAR FROM PREVIOUS SURGERY: Chronic | ICD-10-CM

## 2025-04-16 DIAGNOSIS — Z88.0 ALLERGY STATUS TO PENICILLIN: ICD-10-CM

## 2025-04-16 DIAGNOSIS — Z98.890 OTHER SPECIFIED POSTPROCEDURAL STATES: Chronic | ICD-10-CM

## 2025-04-16 DIAGNOSIS — K29.50 UNSPECIFIED CHRONIC GASTRITIS WITHOUT BLEEDING: ICD-10-CM

## 2025-04-16 DIAGNOSIS — K64.8 OTHER HEMORRHOIDS: ICD-10-CM

## 2025-04-16 DIAGNOSIS — K63.5 POLYP OF COLON: ICD-10-CM

## 2025-04-16 DIAGNOSIS — K62.5 HEMORRHAGE OF ANUS AND RECTUM: ICD-10-CM

## 2025-04-16 DIAGNOSIS — Z98.82 BREAST IMPLANT STATUS: Chronic | ICD-10-CM

## 2025-04-16 DIAGNOSIS — K31.7 POLYP OF STOMACH AND DUODENUM: ICD-10-CM

## 2025-04-16 PROCEDURE — 88312 SPECIAL STAINS GROUP 1: CPT

## 2025-04-16 PROCEDURE — 45380 COLONOSCOPY AND BIOPSY: CPT | Mod: XS

## 2025-04-16 PROCEDURE — 88305 TISSUE EXAM BY PATHOLOGIST: CPT | Mod: 26,59

## 2025-04-16 PROCEDURE — 88312 SPECIAL STAINS GROUP 1: CPT | Mod: 26

## 2025-04-16 PROCEDURE — 45385 COLONOSCOPY W/LESION REMOVAL: CPT | Mod: XU

## 2025-04-16 PROCEDURE — 88305 TISSUE EXAM BY PATHOLOGIST: CPT

## 2025-04-16 PROCEDURE — 88305 TISSUE EXAM BY PATHOLOGIST: CPT | Mod: 26

## 2025-04-16 PROCEDURE — 43239 EGD BIOPSY SINGLE/MULTIPLE: CPT

## 2025-04-16 NOTE — ASU DISCHARGE PLAN (ADULT/PEDIATRIC) - CARE PROVIDER_API CALL
Sushma Garza  Gastroenterology  [address] [place] [address]  Phone: [phone]  Fax: [phone]  Follow Up Time:

## 2025-04-16 NOTE — H&P PST ADULT - HISTORY OF PRESENT ILLNESS
50-year female patient with history of MAIRA is referred for evaluation of fresh blood per rectum. Patient with constipation, bloating, and atypical GERD symptoms  Labs showed MAIRA  ?  ?  #Fresh blood per rectum  #Constipation  ?  Patient will benefit from Colonoscopy, risks and benefits discussed and patient is agreeable  MiraLAX once daily  High fiber diet advised  Anusol once at night for 10 nights  ?  #Atypical GERD  GERD diet advised with lifestyle modifications  Protonix 40 mg daily  Patient will benefit from EGD. Risks and benefits discussed, and patient is agreeable  ?  #MAIRA  Will proceed with EGD/ Colon for further evaluation and to rule out GI related causes.

## 2025-04-16 NOTE — ASU PATIENT PROFILE, ADULT - NSICDXPASTSURGICALHX_GEN_ALL_CORE_FT
PAST SURGICAL HISTORY:  H/O abdominoplasty     H/O breast implant     H/O  section     S/P dilation and curettage

## 2025-04-16 NOTE — ASU DISCHARGE PLAN (ADULT/PEDIATRIC) - FINANCIAL ASSISTANCE
[facility] provides services at a reduced cost to those who are determined to be eligible through [facility]’s financial assistance program. Information regarding [facility]’s financial assistance program can be found by going to [email or web address] or by calling [phone].

## 2025-04-16 NOTE — H&P PST ADULT - ASSESSMENT
50-year female patient with history of MAIRA is referred for evaluation of fresh blood per rectum. Patient with constipation, bloating, and atypical GERD symptoms  Labs showed MAIRA  ?  ?  #Fresh blood per rectum  #Constipation  ?  Patient will benefit from Colonoscopy, risks and benefits discussed and patient is agreeable  MiraLAX once daily  High fiber diet advised  Anusol once at night for 10 nights  ?  #Atypical GERD  GERD diet advised with lifestyle modifications  Protonix 40 mg daily  Patient will benefit from EGD. Risks and benefits discussed, and patient is agreeable  ?  #MAIRA  Will proceed with EGD/ Colon for further evaluation and to rule out GI related causes.     #Fresh blood per rectum  #Constipation  ?  Patient will benefit from Colonoscopy, risks and benefits discussed and patient is agreeable  MiraLAX once daily  High fiber diet advised  Anusol once at night for 10 nights  ?  #Atypical GERD  GERD diet advised with lifestyle modifications  Protonix 40 mg daily  Patient will benefit from EGD. Risks and benefits discussed, and patient is agreeable  ?  #MAIRA  Will proceed with EGD/ Colon for further evaluation and to rule out GI related causes.

## 2025-04-16 NOTE — ASU PREOP CHECKLIST - 1.
pt unable to void for u preg, states" Jennifer had my tubes tied and there's no way pregnant I had my lmp 2 weeks ago pt unable to void for u preg, states" Jennifer had my tubes tied and there's no way pregnant I had my lmp 2 weeks ago, RN AND md AWARE

## 2025-04-16 NOTE — PACU DISCHARGE NOTE - HYDRATION STATUS:
Son called to see if there was anything patient could take for severe leg pain .  Please advise
Satisfactory

## 2025-04-16 NOTE — ASU PREOP CHECKLIST - HEIGHT IN FEET
Relevant Problems   No relevant active problems       Anesthetic History   No history of anesthetic complications  PONV          Review of Systems / Medical History  Patient summary reviewed, nursing notes reviewed and pertinent labs reviewed    Pulmonary  Within defined limits      Sleep apnea           Neuro/Psych   Within defined limits           Cardiovascular  Within defined limits  Hypertension      CHF  Dysrhythmias            GI/Hepatic/Renal  Within defined limits   GERD           Endo/Other  Within defined limits  Diabetes  Hypothyroidism  Arthritis     Other Findings              Physical Exam    Airway  Mallampati: II  TM Distance: > 6 cm  Neck ROM: normal range of motion   Mouth opening: Normal     Cardiovascular  Regular rate and rhythm,  S1 and S2 normal,  no murmur, click, rub, or gallop             Dental    Dentition: Poor dentition     Pulmonary  Breath sounds clear to auscultation               Abdominal  GI exam deferred       Other Findings            Anesthetic Plan    ASA: 3  Anesthesia type: MAC            Anesthetic plan and risks discussed with: Patient 5

## 2025-04-17 ENCOUNTER — NON-APPOINTMENT (OUTPATIENT)
Age: 50
End: 2025-04-17

## 2025-04-18 ENCOUNTER — NON-APPOINTMENT (OUTPATIENT)
Age: 50
End: 2025-04-18

## 2025-04-29 ENCOUNTER — APPOINTMENT (OUTPATIENT)
Dept: GASTROENTEROLOGY | Facility: CLINIC | Age: 50
End: 2025-04-29
Payer: COMMERCIAL

## 2025-04-29 ENCOUNTER — NON-APPOINTMENT (OUTPATIENT)
Age: 50
End: 2025-04-29

## 2025-04-29 VITALS
SYSTOLIC BLOOD PRESSURE: 123 MMHG | HEIGHT: 63 IN | HEART RATE: 85 BPM | BODY MASS INDEX: 23.32 KG/M2 | DIASTOLIC BLOOD PRESSURE: 89 MMHG | WEIGHT: 131.6 LBS

## 2025-04-29 DIAGNOSIS — K63.5 POLYP OF COLON: ICD-10-CM

## 2025-04-29 DIAGNOSIS — D50.9 IRON DEFICIENCY ANEMIA, UNSPECIFIED: ICD-10-CM

## 2025-04-29 DIAGNOSIS — R10.84 GENERALIZED ABDOMINAL PAIN: ICD-10-CM

## 2025-04-29 DIAGNOSIS — K59.00 CONSTIPATION, UNSPECIFIED: ICD-10-CM

## 2025-04-29 DIAGNOSIS — R14.0 ABDOMINAL DISTENSION (GASEOUS): ICD-10-CM

## 2025-04-29 DIAGNOSIS — Z78.9 OTHER SPECIFIED HEALTH STATUS: ICD-10-CM

## 2025-04-29 PROCEDURE — 99214 OFFICE O/P EST MOD 30 MIN: CPT

## 2025-05-20 ENCOUNTER — RESULT REVIEW (OUTPATIENT)
Age: 50
End: 2025-05-20

## 2025-05-20 ENCOUNTER — OUTPATIENT (OUTPATIENT)
Dept: OUTPATIENT SERVICES | Facility: HOSPITAL | Age: 50
LOS: 1 days | End: 2025-05-20
Payer: COMMERCIAL

## 2025-05-20 DIAGNOSIS — Z98.890 OTHER SPECIFIED POSTPROCEDURAL STATES: Chronic | ICD-10-CM

## 2025-05-20 DIAGNOSIS — Z98.891 HISTORY OF UTERINE SCAR FROM PREVIOUS SURGERY: Chronic | ICD-10-CM

## 2025-05-20 DIAGNOSIS — R10.84 GENERALIZED ABDOMINAL PAIN: ICD-10-CM

## 2025-05-20 DIAGNOSIS — Z98.82 BREAST IMPLANT STATUS: Chronic | ICD-10-CM

## 2025-05-20 PROCEDURE — 74177 CT ABD & PELVIS W/CONTRAST: CPT | Mod: 26

## 2025-05-20 PROCEDURE — 74177 CT ABD & PELVIS W/CONTRAST: CPT

## 2025-05-21 DIAGNOSIS — K42.9 UMBILICAL HERNIA W/OUT OBSTRUCTION OR GANGRENE: ICD-10-CM

## 2025-05-21 DIAGNOSIS — R10.84 GENERALIZED ABDOMINAL PAIN: ICD-10-CM

## 2025-06-26 ENCOUNTER — APPOINTMENT (OUTPATIENT)
Dept: SURGERY | Facility: CLINIC | Age: 50
End: 2025-06-26
Payer: COMMERCIAL

## 2025-06-26 VITALS — HEIGHT: 63 IN | WEIGHT: 128 LBS | BODY MASS INDEX: 22.68 KG/M2

## 2025-06-26 PROBLEM — K42.0 UMBILICAL HERNIA WITH OBSTRUCTION, WITHOUT GANGRENE: Status: ACTIVE | Noted: 2025-06-26

## 2025-06-26 PROCEDURE — 99203 OFFICE O/P NEW LOW 30 MIN: CPT

## 2025-07-23 ENCOUNTER — APPOINTMENT (OUTPATIENT)
Dept: SURGERY | Facility: AMBULATORY SURGERY CENTER | Age: 50
End: 2025-07-23

## 2025-07-30 ENCOUNTER — APPOINTMENT (OUTPATIENT)
Dept: SURGERY | Facility: CLINIC | Age: 50
End: 2025-07-30

## 2025-08-22 ENCOUNTER — RESULT REVIEW (OUTPATIENT)
Age: 50
End: 2025-08-22

## 2025-08-22 ENCOUNTER — OUTPATIENT (OUTPATIENT)
Dept: OUTPATIENT SERVICES | Facility: HOSPITAL | Age: 50
LOS: 1 days | End: 2025-08-22
Payer: COMMERCIAL

## 2025-08-22 DIAGNOSIS — Z98.890 OTHER SPECIFIED POSTPROCEDURAL STATES: Chronic | ICD-10-CM

## 2025-08-22 DIAGNOSIS — Z98.82 BREAST IMPLANT STATUS: Chronic | ICD-10-CM

## 2025-08-22 DIAGNOSIS — Z12.31 ENCOUNTER FOR SCREENING MAMMOGRAM FOR MALIGNANT NEOPLASM OF BREAST: ICD-10-CM

## 2025-08-22 DIAGNOSIS — Z98.891 HISTORY OF UTERINE SCAR FROM PREVIOUS SURGERY: Chronic | ICD-10-CM

## 2025-08-22 DIAGNOSIS — R92.2 INCONCLUSIVE MAMMOGRAM: ICD-10-CM

## 2025-08-22 PROCEDURE — 76641 ULTRASOUND BREAST COMPLETE: CPT | Mod: 50

## 2025-08-22 PROCEDURE — 77063 BREAST TOMOSYNTHESIS BI: CPT | Mod: 26

## 2025-08-22 PROCEDURE — 76641 ULTRASOUND BREAST COMPLETE: CPT | Mod: 26,50

## 2025-08-22 PROCEDURE — 77067 SCR MAMMO BI INCL CAD: CPT | Mod: 26

## 2025-08-22 PROCEDURE — 77063 BREAST TOMOSYNTHESIS BI: CPT

## 2025-08-22 PROCEDURE — 77067 SCR MAMMO BI INCL CAD: CPT

## 2025-08-23 DIAGNOSIS — R92.2 INCONCLUSIVE MAMMOGRAM: ICD-10-CM

## 2025-08-23 DIAGNOSIS — Z12.31 ENCOUNTER FOR SCREENING MAMMOGRAM FOR MALIGNANT NEOPLASM OF BREAST: ICD-10-CM

## 2025-09-15 ENCOUNTER — NON-APPOINTMENT (OUTPATIENT)
Age: 50
End: 2025-09-15

## 2025-09-15 ENCOUNTER — APPOINTMENT (OUTPATIENT)
Dept: OBGYN | Facility: CLINIC | Age: 50
End: 2025-09-15
Payer: COMMERCIAL

## 2025-09-15 VITALS
SYSTOLIC BLOOD PRESSURE: 130 MMHG | DIASTOLIC BLOOD PRESSURE: 89 MMHG | HEIGHT: 63 IN | BODY MASS INDEX: 22.68 KG/M2 | HEART RATE: 89 BPM | WEIGHT: 128 LBS

## 2025-09-15 DIAGNOSIS — N92.1 EXCESSIVE AND FREQUENT MENSTRUATION WITH IRREGULAR CYCLE: ICD-10-CM

## 2025-09-15 PROCEDURE — 99213 OFFICE O/P EST LOW 20 MIN: CPT | Mod: 25

## 2025-09-15 PROCEDURE — 76830 TRANSVAGINAL US NON-OB: CPT

## 2025-09-15 RX ORDER — NORETHINDRONE ACETATE 5 MG/1
5 TABLET ORAL
Qty: 42 | Refills: 0 | Status: ACTIVE | COMMUNITY
Start: 2025-09-15 | End: 1900-01-01

## 2025-09-18 ENCOUNTER — APPOINTMENT (OUTPATIENT)
Dept: OBGYN | Facility: CLINIC | Age: 50
End: 2025-09-18